# Patient Record
Sex: MALE | Race: BLACK OR AFRICAN AMERICAN | NOT HISPANIC OR LATINO | URBAN - METROPOLITAN AREA
[De-identification: names, ages, dates, MRNs, and addresses within clinical notes are randomized per-mention and may not be internally consistent; named-entity substitution may affect disease eponyms.]

---

## 2020-12-16 ENCOUNTER — LAB OUTSIDE AN ENCOUNTER (OUTPATIENT)
Dept: URBAN - METROPOLITAN AREA CLINIC 105 | Facility: CLINIC | Age: 65
End: 2020-12-16

## 2020-12-16 ENCOUNTER — OFFICE VISIT (OUTPATIENT)
Dept: URBAN - METROPOLITAN AREA CLINIC 105 | Facility: CLINIC | Age: 65
End: 2020-12-16
Payer: COMMERCIAL

## 2020-12-16 DIAGNOSIS — Z12.11 SCREEN FOR COLON CANCER: ICD-10-CM

## 2020-12-16 DIAGNOSIS — R19.5 DARK STOOLS: ICD-10-CM

## 2020-12-16 DIAGNOSIS — K92.1 BLOOD IN STOOL: ICD-10-CM

## 2020-12-16 DIAGNOSIS — I10 ESSENTIAL HYPERTENSION: ICD-10-CM

## 2020-12-16 DIAGNOSIS — F12.10 MARIJUANA ABUSE: ICD-10-CM

## 2020-12-16 DIAGNOSIS — K30 INDIGESTION: ICD-10-CM

## 2020-12-16 DIAGNOSIS — R12 HEARTBURN: ICD-10-CM

## 2020-12-16 DIAGNOSIS — K59.09 CHRONIC CONSTIPATION: ICD-10-CM

## 2020-12-16 PROCEDURE — G8417 CALC BMI ABV UP PARAM F/U: HCPCS | Performed by: INTERNAL MEDICINE

## 2020-12-16 PROCEDURE — 99204 OFFICE O/P NEW MOD 45 MIN: CPT | Performed by: INTERNAL MEDICINE

## 2020-12-16 PROCEDURE — G8482 FLU IMMUNIZE ORDER/ADMIN: HCPCS | Performed by: INTERNAL MEDICINE

## 2020-12-16 PROCEDURE — G8427 DOCREV CUR MEDS BY ELIG CLIN: HCPCS | Performed by: INTERNAL MEDICINE

## 2020-12-16 PROCEDURE — 3017F COLORECTAL CA SCREEN DOC REV: CPT | Performed by: INTERNAL MEDICINE

## 2020-12-16 PROCEDURE — G9903 PT SCRN TBCO ID AS NON USER: HCPCS | Performed by: INTERNAL MEDICINE

## 2020-12-16 RX ORDER — AMLODIPINE BESYLATE 5 MG/1
AS DIRECTED TABLET ORAL
Status: ACTIVE | COMMUNITY

## 2020-12-16 NOTE — HPI-OTHER HISTORIES
64 yo male pt of the [facility]. DR Mindi Sebastian.  He had a normal colonoscopy at the age of 51. He has a BM daily, occasional constipation with straining with blood in tissue wipe for many years. He has no abdominal pain.  He has intermittent indigestion, a few times a week, post prandial. Associated with nausea and heartburn. No dysphagia. Occasional beer "I dont drink a lot". He smokes marijuana occasionally. HE denies change in bowel habits and states will have black stools when he drinks grape juice. Last a week ago.

## 2020-12-17 LAB
BASO (ABSOLUTE): 0
BASOS: 0
EOS (ABSOLUTE): 0.4
EOS: 6
HEMATOCRIT: 43.7
HEMATOLOGY COMMENTS:: (no result)
HEMOGLOBIN: 14.4
IMMATURE CELLS: (no result)
IMMATURE GRANS (ABS): 0
IMMATURE GRANULOCYTES: 0
LYMPHS (ABSOLUTE): 2.7
LYMPHS: 38
MCH: 28.5
MCHC: 33
MCV: 86
MONOCYTES(ABSOLUTE): 0.6
MONOCYTES: 8
NEUTROPHILS (ABSOLUTE): 3.6
NEUTROPHILS: 48
NRBC: (no result)
PLATELETS: 286
RBC: 5.06
RDW: 14.4
WBC: 7.3

## 2020-12-30 ENCOUNTER — OFFICE VISIT (OUTPATIENT)
Dept: URBAN - METROPOLITAN AREA SURGERY CENTER 16 | Facility: SURGERY CENTER | Age: 65
End: 2020-12-30
Payer: COMMERCIAL

## 2020-12-30 DIAGNOSIS — Z12.11 COLON CANCER SCREENING: ICD-10-CM

## 2020-12-30 DIAGNOSIS — K29.60 ADENOPAPILLOMATOSIS GASTRICA: ICD-10-CM

## 2020-12-30 DIAGNOSIS — D12.2 ADENOMA OF ASCENDING COLON: ICD-10-CM

## 2020-12-30 PROCEDURE — G9933 CANC DETECTD DURING COL SCRN: HCPCS | Performed by: INTERNAL MEDICINE

## 2020-12-30 PROCEDURE — G8907 PT DOC NO EVENTS ON DISCHARG: HCPCS | Performed by: INTERNAL MEDICINE

## 2020-12-30 PROCEDURE — 45385 COLONOSCOPY W/LESION REMOVAL: CPT | Performed by: INTERNAL MEDICINE

## 2020-12-30 PROCEDURE — 43239 EGD BIOPSY SINGLE/MULTIPLE: CPT | Performed by: INTERNAL MEDICINE

## 2020-12-30 RX ORDER — AMLODIPINE BESYLATE 5 MG/1
AS DIRECTED TABLET ORAL
Status: ACTIVE | COMMUNITY

## 2021-01-07 ENCOUNTER — TELEPHONE ENCOUNTER (OUTPATIENT)
Dept: URBAN - METROPOLITAN AREA CLINIC 105 | Facility: CLINIC | Age: 66
End: 2021-01-07

## 2021-05-10 ENCOUNTER — OFFICE VISIT (OUTPATIENT)
Dept: URBAN - METROPOLITAN AREA CLINIC 105 | Facility: CLINIC | Age: 66
End: 2021-05-10
Payer: COMMERCIAL

## 2021-05-10 ENCOUNTER — WEB ENCOUNTER (OUTPATIENT)
Dept: URBAN - METROPOLITAN AREA CLINIC 105 | Facility: CLINIC | Age: 66
End: 2021-05-10

## 2021-05-10 ENCOUNTER — LAB OUTSIDE AN ENCOUNTER (OUTPATIENT)
Dept: URBAN - METROPOLITAN AREA CLINIC 105 | Facility: CLINIC | Age: 66
End: 2021-05-10

## 2021-05-10 DIAGNOSIS — K21.00 GASTROESOPHAGEAL REFLUX DISEASE WITH ESOPHAGITIS WITHOUT HEMORRHAGE: ICD-10-CM

## 2021-05-10 DIAGNOSIS — K59.09 CHRONIC CONSTIPATION: ICD-10-CM

## 2021-05-10 DIAGNOSIS — R14.0 BLOATING: ICD-10-CM

## 2021-05-10 DIAGNOSIS — R12 HEARTBURN: ICD-10-CM

## 2021-05-10 DIAGNOSIS — R19.5 DARK STOOLS: ICD-10-CM

## 2021-05-10 DIAGNOSIS — K92.1 BLOOD IN STOOL: ICD-10-CM

## 2021-05-10 DIAGNOSIS — D12.6 COLON ADENOMA: ICD-10-CM

## 2021-05-10 DIAGNOSIS — F12.10 MARIJUANA ABUSE: ICD-10-CM

## 2021-05-10 DIAGNOSIS — I10 ESSENTIAL HYPERTENSION: ICD-10-CM

## 2021-05-10 DIAGNOSIS — K30 INDIGESTION: ICD-10-CM

## 2021-05-10 PROBLEM — 162031009: Status: ACTIVE | Noted: 2020-12-16

## 2021-05-10 PROBLEM — 37344009: Status: ACTIVE | Noted: 2020-12-16

## 2021-05-10 PROBLEM — 59621000: Status: ACTIVE | Noted: 2020-12-16

## 2021-05-10 PROCEDURE — 99213 OFFICE O/P EST LOW 20 MIN: CPT | Performed by: INTERNAL MEDICINE

## 2021-05-10 RX ORDER — OMEPRAZOLE 40 MG/1
1 CAPSULE 30 MINUTES BEFORE MORNING MEAL CAPSULE, DELAYED RELEASE ORAL ONCE A DAY
Qty: 90 | Refills: 0 | OUTPATIENT
Start: 2021-05-10

## 2021-05-10 RX ORDER — AMLODIPINE BESYLATE 5 MG/1
AS DIRECTED TABLET ORAL
Status: ACTIVE | COMMUNITY

## 2021-05-10 NOTE — HPI-OTHER HISTORIES
12/2020  66 yo male pt of the [facility]. DR Mindi Sebastian.  He had a normal colonoscopy at the age of 51. He has a BM daily, occasional constipation with straining with blood in tissue wipe for many years. He has no abdominal pain.  He has intermittent indigestion, a few times a week, post prandial. Associated with nausea and heartburn. No dysphagia. Occasional beer "I dont drink a lot". He smokes marijuana occasionally. HE denies change in bowel habits and states will have black stools when he drinks grape juice. Last a week ago.   5/10/21 Discussed eGD and colonoscopy results.  Answered questions.  c/o bloating mostly worse post prandially.  Also says BMS are irregular - small amounts 'its not like it should be". NO blood in stool and stools are not black anymore".  He took PPI prn , did not take daily.  Still smoking marijuana

## 2021-06-15 ENCOUNTER — DASHBOARD ENCOUNTERS (OUTPATIENT)
Age: 66
End: 2021-06-15

## 2021-06-17 ENCOUNTER — OFFICE VISIT (OUTPATIENT)
Dept: URBAN - METROPOLITAN AREA CLINIC 105 | Facility: CLINIC | Age: 66
End: 2021-06-17

## 2021-06-17 RX ORDER — OMEPRAZOLE 40 MG/1
1 CAPSULE 30 MINUTES BEFORE MORNING MEAL CAPSULE, DELAYED RELEASE ORAL ONCE A DAY
Qty: 90 | Refills: 0 | Status: ACTIVE | COMMUNITY
Start: 2021-05-10

## 2021-06-17 RX ORDER — AMLODIPINE BESYLATE 5 MG/1
AS DIRECTED TABLET ORAL
Status: ACTIVE | COMMUNITY